# Patient Record
Sex: MALE | Race: OTHER | NOT HISPANIC OR LATINO | Employment: OTHER | URBAN - METROPOLITAN AREA
[De-identification: names, ages, dates, MRNs, and addresses within clinical notes are randomized per-mention and may not be internally consistent; named-entity substitution may affect disease eponyms.]

---

## 2024-04-04 ENCOUNTER — OFFICE VISIT (OUTPATIENT)
Dept: PAIN MEDICINE | Facility: CLINIC | Age: 64
End: 2024-04-04
Payer: COMMERCIAL

## 2024-04-04 DIAGNOSIS — M47.812 CERVICAL SPONDYLOSIS: ICD-10-CM

## 2024-04-04 DIAGNOSIS — M77.8 RIGHT SHOULDER TENDINITIS: Primary | ICD-10-CM

## 2024-04-04 DIAGNOSIS — M79.18 MYOFASCIAL PAIN: ICD-10-CM

## 2024-04-04 PROCEDURE — 99214 OFFICE O/P EST MOD 30 MIN: CPT | Performed by: PHYSICAL MEDICINE & REHABILITATION

## 2024-04-04 NOTE — PROGRESS NOTES
Chief complaint  Painin the R shoulder    History  Fredo Hill is back for pain management office visit  I have not seen him for a while . Usually it is the back . That is now better  Lately the R shoulder is affecting him   The symptoms appeared spontaneously without history intervening trauma or falls.  The pain is interfering with activities of daily living, quality of life and quality of sleep. It is limiting the functions and everything takes longer to complete because of the slowing related to the pain. Movements are cautious to avoid aggravation of the symptoms.   Pain in the shoulder and around shoulder blade  The pain in the righ shoulder is deep and stabbing.  It is associated with sharp sensation inside the shoulder joint mainly with movements.  The pain is continuous at rest and it gets worse with reaching forward and overhead.  Also reaching outward increases the pain.  There is no radiation of the pain to the upper limb.  The pain is associated with tight muscle bands around the shoulder blade.  These gets worse with shoulder stabilization like working at shoulder level or even doing any activity with the upper limb when it is not supported.  There is occasional sensation of fine cracking inside the shoulder joint when the shoulder position is changed rapidly.  Also occasionally there is a sensation of fullness inside the shoulder joint.  The right shoulder catches when it reaches at shoulder level.  With maneuvering and external rotation the shoulder can go up few more degrees.  The right shoulder gets tired quickly with any activity.     Also having pain over neck on the Right side The pain at the base of the neck on the right side is deep achy with stabbing sensation worse with extension and bending to the neck to the right.  It eases up with leaning forward and bending to the left.  The worst movement is with extension of the neck combined with rotation to the right side.  It eases up as  above.  The pain is associated with tight muscle bands overlying the right lower cervical area.  No reported radicular symptoms to the upper limb.  No reported weakness in the lower limbs or gait disturbance and no reported bowel or bladder incontinence.  No sensorimotor deficits in the upper or in lower limbs.       Pain level at rest is   3 to 4 /10 , with the agg  pain level 8/10.          Denied any fever or chills. No weight loss and no night sweats. No cough or sputum production. No diarrhea   The constipation has been responding to fibers and over the counter medications.     No bladder and bowel incontinence and no other changes in bladder and bowel. No skin changes.  Reports tiredness and fatigability only if the pain is not controlled.   Denied opioids diversion and abuse and denies alcoholism. Denies overuse of the pain medications.         Physical examination  Awake, alert and oriented for time place and persons   declined Chaperone for the visit and was adequately  draped for the exam.      Examination of the cervical spine showed tight muscle bands over the right lower cervical facet area.  Hunt test was positive with stabilization of the right C5-6 and C6-7 with rotation of the neck to the right combined with extension increased the pain.  The pain eased up with leaning forward.  Negative Spurling maneuver was negative for cervical root tension sign and no pain down to the right upper limb.  No sensorimotor deficits in the upper limbs.  Deep tendon reflexes in the upper limbs are 2/4 for the right biceps triceps and brachioradialis.  No hyperreflexia in the lower limbs.  Plantar cutaneous are downgoing on both sides.     R shoulder impingement at 120 deg no instability positive pain in subacromial   TP around the R [facility]     Diagnosis  Problem List Items Addressed This Visit       Right shoulder tendinitis - Primary    Relevant Orders    Referral to Physical Therapy    Myofascial pain    Relevant  Orders    Referral to Physical Therapy    Cervical spondylosis    Relevant Orders    Referral to Physical Therapy        Plan  Reviewed the pain generators.  Went over the types of pain with neuropathic and nociceptive and different pathologies and therapeutic modalities. Discussed the mechanism of action of interventions from acupuncture, physical therapy , regular exercises, injections, botox, spinal cord stimulation, and role of surgery     Went over pathology of the intervertebral disc displacement and the anatomical relation to the Nerve roots and relation to the radicular symptoms. Went over treatment modalities with conservative treatment including acupuncture   and epidural steroid injection with fluoroscopy guidance and last resort of surgery    Went over the different pahology and shoulder joints ligament and carilage  Will start with PT for shoulder and neck   If not better consider imaging with MRI and xray   And possible injection intraarticular steroid injection and facet  block         Follow-up after above  or earlier if needed     The level of clinical decision making in this office visit,  is high, given the high risks of complications with the morbidity and mortality due to the fact that acute and chronic pain may pose a threat to life and bodily function, if under treated, poorly treated, or with failure to maintain adequate treatment and timely medical follow up. Additionally over treatment has its own set of complications including overdosing on the pain medications and also the habit forming potentials with the use of the medications used to treat chronic painful conditions including therapeutic classes classified as dangerous medications. Given the serious and fluctuating nature of pain level and instensity with extensive consideration for whenever pain changes, there is always the risk of prolonged functional impairment requiring close patient monitoring with regular assessments and  reassessments and high level medical decision making at every office visit. The amount and complexity of data reviewed is high given the patient clinical presentation, labs,  data, radiology reports, and other tests as discussed during office visits. Pertinent data whether positive or negative were taken in consideration in the process of making this high level medical decision.

## 2024-06-01 ENCOUNTER — HOSPITAL ENCOUNTER (EMERGENCY)
Facility: HOSPITAL | Age: 64
Discharge: HOME | End: 2024-06-01
Attending: STUDENT IN AN ORGANIZED HEALTH CARE EDUCATION/TRAINING PROGRAM
Payer: COMMERCIAL

## 2024-06-01 ENCOUNTER — APPOINTMENT (OUTPATIENT)
Dept: CARDIOLOGY | Facility: HOSPITAL | Age: 64
End: 2024-06-01
Payer: COMMERCIAL

## 2024-06-01 VITALS
OXYGEN SATURATION: 95 % | DIASTOLIC BLOOD PRESSURE: 84 MMHG | TEMPERATURE: 98.6 F | BODY MASS INDEX: 30.06 KG/M2 | WEIGHT: 210 LBS | RESPIRATION RATE: 17 BRPM | SYSTOLIC BLOOD PRESSURE: 147 MMHG | HEART RATE: 75 BPM | HEIGHT: 70 IN

## 2024-06-01 DIAGNOSIS — E86.0 DEHYDRATION: ICD-10-CM

## 2024-06-01 DIAGNOSIS — R42 DIZZINESS: Primary | ICD-10-CM

## 2024-06-01 LAB
ANION GAP SERPL CALC-SCNC: 10 MMOL/L (ref 10–20)
BASOPHILS # BLD AUTO: 0.03 X10*3/UL (ref 0–0.1)
BASOPHILS NFR BLD AUTO: 0.3 %
BUN SERPL-MCNC: 15 MG/DL (ref 6–23)
CALCIUM SERPL-MCNC: 9.1 MG/DL (ref 8.6–10.3)
CARDIAC TROPONIN I PNL SERPL HS: 3 NG/L (ref 0–20)
CARDIAC TROPONIN I PNL SERPL HS: 4 NG/L (ref 0–20)
CHLORIDE SERPL-SCNC: 102 MMOL/L (ref 98–107)
CK SERPL-CCNC: 75 U/L (ref 0–325)
CO2 SERPL-SCNC: 27 MMOL/L (ref 21–32)
CREAT SERPL-MCNC: 1.2 MG/DL (ref 0.5–1.3)
EGFRCR SERPLBLD CKD-EPI 2021: 68 ML/MIN/1.73M*2
EOSINOPHIL # BLD AUTO: 0.05 X10*3/UL (ref 0–0.7)
EOSINOPHIL NFR BLD AUTO: 0.6 %
ERYTHROCYTE [DISTWIDTH] IN BLOOD BY AUTOMATED COUNT: 13.1 % (ref 11.5–14.5)
GLUCOSE SERPL-MCNC: 110 MG/DL (ref 74–99)
HCT VFR BLD AUTO: 44.8 % (ref 41–52)
HGB BLD-MCNC: 15.2 G/DL (ref 13.5–17.5)
IMM GRANULOCYTES # BLD AUTO: 0.04 X10*3/UL (ref 0–0.7)
IMM GRANULOCYTES NFR BLD AUTO: 0.5 % (ref 0–0.9)
LYMPHOCYTES # BLD AUTO: 1.61 X10*3/UL (ref 1.2–4.8)
LYMPHOCYTES NFR BLD AUTO: 18.6 %
MCH RBC QN AUTO: 30.3 PG (ref 26–34)
MCHC RBC AUTO-ENTMCNC: 33.9 G/DL (ref 32–36)
MCV RBC AUTO: 89 FL (ref 80–100)
MONOCYTES # BLD AUTO: 0.64 X10*3/UL (ref 0.1–1)
MONOCYTES NFR BLD AUTO: 7.4 %
NEUTROPHILS # BLD AUTO: 6.29 X10*3/UL (ref 1.2–7.7)
NEUTROPHILS NFR BLD AUTO: 72.6 %
NRBC BLD-RTO: 0 /100 WBCS (ref 0–0)
PLATELET # BLD AUTO: 232 X10*3/UL (ref 150–450)
POTASSIUM SERPL-SCNC: 3.7 MMOL/L (ref 3.5–5.3)
RBC # BLD AUTO: 5.01 X10*6/UL (ref 4.5–5.9)
SODIUM SERPL-SCNC: 135 MMOL/L (ref 136–145)
WBC # BLD AUTO: 8.7 X10*3/UL (ref 4.4–11.3)

## 2024-06-01 PROCEDURE — 99283 EMERGENCY DEPT VISIT LOW MDM: CPT | Mod: 25

## 2024-06-01 PROCEDURE — 82550 ASSAY OF CK (CPK): CPT | Performed by: STUDENT IN AN ORGANIZED HEALTH CARE EDUCATION/TRAINING PROGRAM

## 2024-06-01 PROCEDURE — 36415 COLL VENOUS BLD VENIPUNCTURE: CPT | Performed by: STUDENT IN AN ORGANIZED HEALTH CARE EDUCATION/TRAINING PROGRAM

## 2024-06-01 PROCEDURE — 84484 ASSAY OF TROPONIN QUANT: CPT | Performed by: STUDENT IN AN ORGANIZED HEALTH CARE EDUCATION/TRAINING PROGRAM

## 2024-06-01 PROCEDURE — 85025 COMPLETE CBC W/AUTO DIFF WBC: CPT | Performed by: STUDENT IN AN ORGANIZED HEALTH CARE EDUCATION/TRAINING PROGRAM

## 2024-06-01 PROCEDURE — 2500000004 HC RX 250 GENERAL PHARMACY W/ HCPCS (ALT 636 FOR OP/ED): Performed by: STUDENT IN AN ORGANIZED HEALTH CARE EDUCATION/TRAINING PROGRAM

## 2024-06-01 PROCEDURE — 93005 ELECTROCARDIOGRAM TRACING: CPT

## 2024-06-01 PROCEDURE — 80048 BASIC METABOLIC PNL TOTAL CA: CPT | Performed by: STUDENT IN AN ORGANIZED HEALTH CARE EDUCATION/TRAINING PROGRAM

## 2024-06-01 PROCEDURE — 96360 HYDRATION IV INFUSION INIT: CPT

## 2024-06-01 RX ADMIN — SODIUM CHLORIDE, POTASSIUM CHLORIDE, SODIUM LACTATE AND CALCIUM CHLORIDE 1000 ML: 600; 310; 30; 20 INJECTION, SOLUTION INTRAVENOUS at 15:14

## 2024-06-01 ASSESSMENT — COLUMBIA-SUICIDE SEVERITY RATING SCALE - C-SSRS
1. IN THE PAST MONTH, HAVE YOU WISHED YOU WERE DEAD OR WISHED YOU COULD GO TO SLEEP AND NOT WAKE UP?: NO
6. HAVE YOU EVER DONE ANYTHING, STARTED TO DO ANYTHING, OR PREPARED TO DO ANYTHING TO END YOUR LIFE?: NO
2. HAVE YOU ACTUALLY HAD ANY THOUGHTS OF KILLING YOURSELF?: NO

## 2024-06-01 ASSESSMENT — PAIN SCALES - GENERAL: PAINLEVEL_OUTOF10: 3

## 2024-06-01 ASSESSMENT — PAIN - FUNCTIONAL ASSESSMENT: PAIN_FUNCTIONAL_ASSESSMENT: 0-10

## 2024-06-01 NOTE — DISCHARGE INSTRUCTIONS
You are seen in the emergency department for dizziness and fatigue after working for a long period of time.  We suspect that your symptoms are secondary to dehydration.  Would recommend that she keep yourself hydrated and take breaks as needed when you are working.  Please return to the emergency department if your symptoms return or if you have any other concerns.  Please follow-up with your primary care physician.

## 2024-06-01 NOTE — ED PROVIDER NOTES
HPI   Chief Complaint   Patient presents with    Dizziness     Outside doling yard work       History of hypertension presents with lightheadedness and weakness of all of his muscles after working outside for an hour and 1/2 to 2 hours.  States that he had no water today.  States that he usually just drinks coffee.  States that he currently feels much better after receiving fluids by EMS.  Currently denies headache, vision changes, neck pain, chest pain, shortness of breath, abdominal pain, nausea, vomiting, difficulty talking, difficulty swallowing, and focal numbness/weakness.  Never lost consciousness.  Never struck his head.      History provided by:  Patient   used: No                        [place] Coma Scale Score: 15                     Patient History   Past Medical History:   Diagnosis Date    Anal fistula     Fistula-in-ano    Dorsopathy, unspecified     Disorder of back     Past Surgical History:   Procedure Laterality Date    OTHER SURGICAL HISTORY  02/03/2015    Fistula-in-ano Repair    OTHER SURGICAL HISTORY  02/03/2015    Anal Surgery    OTHER SURGICAL HISTORY  09/30/2016    Laparoscopy Repair Of Initial Inguinal Hernia     No family history on file.  Social History     Tobacco Use    Smoking status: Not on file    Smokeless tobacco: Not on file   Substance Use Topics    Alcohol use: Not on file    Drug use: Not on file       Physical Exam   ED Triage Vitals   Temperature Heart Rate Respirations BP   06/01/24 1449 06/01/24 1445 06/01/24 1445 06/01/24 1449   37 °C (98.6 °F) 70 17 138/82      Pulse Ox Temp src Heart Rate Source Patient Position   06/01/24 1445 -- -- --   100 %         BP Location FiO2 (%)     -- --             Physical Exam  Vitals and nursing note reviewed.   HENT:      Head: Atraumatic.      Mouth/Throat:      Mouth: Mucous membranes are moist.   Eyes:      Conjunctiva/sclera: Conjunctivae normal.   Cardiovascular:      Rate and Rhythm: Normal rate and regular  rhythm.      Pulses: Normal pulses.   Pulmonary:      Effort: Pulmonary effort is normal.      Breath sounds: Normal breath sounds.   Abdominal:      Palpations: Abdomen is soft.      Tenderness: There is no abdominal tenderness.   Musculoskeletal:         General: No deformity.      Cervical back: Normal range of motion.   Skin:     General: Skin is warm and dry.   Neurological:      Mental Status: He is alert.      Comments: Mentating appropriately.  Cranial nerves II through XII are grossly intact.  No truncal ataxia.  No limb ataxia finger-nose-finger or heel-to-shin.  Sensation tact light touch in all extremities.  No drift with any of the extremities.         ED Course & MDM   ED Course as of 06/01/24 1742   Sat Jun 01, 2024   1605 ECG 12 lead  My ECG interpretation: Normal sinus rhythm, heart rate 71, no ST segment elevation, QTc 451 [AB]      ED Course User Index  [AB] Kristian Fry MD         Diagnoses as of 06/01/24 1742   Dizziness   Dehydration       Medical Decision Making  63-year-old gent with a history of hypertension presents with lightheadedness and weakness after working outside for an extended period of time without consuming water.  On exam, he is very well-appearing.  He is hemodynamically stable.  Suspect his symptoms are secondary to dehydration and working in the heat.  He did note symptomatic improvement with IV fluids.  Did consider other pathology, such as acute cardiac pathology, particularly ACS; no real typical cardiac features; reassuring troponins x 2; no ischemic changes on ECG.  Did also consider electrolyte abnormalities, which was not seen on laboratory diagnostics.  Patient currently no longer has dizziness and my suspicion for posterior circulation pathology is quite low and further imaging/workup at this time is not indicated.  Discharge and follow-up with his primary care physician.  Discussed return precautions.    Amount and/or Complexity of Data Reviewed  Labs:  ordered.  ECG/medicine tests: ordered and independent interpretation performed. Decision-making details documented in ED Course.        Procedure  Procedures     Kristian Fry MD  06/01/24 3481

## 2024-06-05 LAB
ATRIAL RATE: 69 BPM
P AXIS: 52 DEGREES
PR INTERVAL: 198 MS
Q ONSET: 251 MS
QRS COUNT: 11 BEATS
QRS DURATION: 95 MS
QT INTERVAL: 415 MS
QTC CALCULATION(BAZETT): 451 MS
QTC FREDERICIA: 439 MS
R AXIS: 21 DEGREES
T AXIS: 69 DEGREES
T OFFSET: 459 MS
VENTRICULAR RATE: 71 BPM